# Patient Record
Sex: FEMALE | Race: WHITE | NOT HISPANIC OR LATINO | URBAN - METROPOLITAN AREA
[De-identification: names, ages, dates, MRNs, and addresses within clinical notes are randomized per-mention and may not be internally consistent; named-entity substitution may affect disease eponyms.]

---

## 2020-05-21 ENCOUNTER — EMERGENCY (EMERGENCY)
Facility: HOSPITAL | Age: 25
LOS: 1 days | Discharge: HOME | End: 2020-05-21
Attending: EMERGENCY MEDICINE | Admitting: EMERGENCY MEDICINE
Payer: COMMERCIAL

## 2020-05-21 VITALS
DIASTOLIC BLOOD PRESSURE: 91 MMHG | SYSTOLIC BLOOD PRESSURE: 137 MMHG | TEMPERATURE: 98 F | HEART RATE: 81 BPM | RESPIRATION RATE: 22 BRPM

## 2020-05-21 PROCEDURE — 99291 CRITICAL CARE FIRST HOUR: CPT

## 2020-05-21 PROCEDURE — 99053 MED SERV 10PM-8AM 24 HR FAC: CPT

## 2020-05-21 PROCEDURE — 93010 ELECTROCARDIOGRAM REPORT: CPT | Mod: 59

## 2020-05-21 RX ORDER — SODIUM CHLORIDE 9 MG/ML
1000 INJECTION INTRAMUSCULAR; INTRAVENOUS; SUBCUTANEOUS ONCE
Refills: 0 | Status: COMPLETED | OUTPATIENT
Start: 2020-05-21 | End: 2020-05-21

## 2020-05-21 RX ADMIN — Medication 2 MILLIGRAM(S): at 23:45

## 2020-05-21 NOTE — ED ADULT TRIAGE NOTE - OTHER COMPLAINTS
witnessed seizure like activity at home, described as jerking movements and foaming in the mouth. witnessed by mom, at 6:30 lasted approx "couple minutes". upon arrival to ED, patient A+O x 4 answering questions appropriately. ambulated from triage to stretcher independently.  Hx of depression, anxiety. Started on lithium today by psychiatrist. Denies incontinence, bit tongue, no aura.

## 2020-05-22 ENCOUNTER — APPOINTMENT (OUTPATIENT)
Dept: NEUROLOGY | Facility: CLINIC | Age: 25
End: 2020-05-22
Payer: SELF-PAY

## 2020-05-22 VITALS
HEART RATE: 91 BPM | OXYGEN SATURATION: 98 % | SYSTOLIC BLOOD PRESSURE: 128 MMHG | DIASTOLIC BLOOD PRESSURE: 82 MMHG | TEMPERATURE: 99 F | RESPIRATION RATE: 18 BRPM

## 2020-05-22 DIAGNOSIS — R56.9 UNSPECIFIED CONVULSIONS: ICD-10-CM

## 2020-05-22 DIAGNOSIS — F41.9 ANXIETY DISORDER, UNSPECIFIED: ICD-10-CM

## 2020-05-22 DIAGNOSIS — Z72.0 TOBACCO USE: ICD-10-CM

## 2020-05-22 DIAGNOSIS — F32.9 ANXIETY DISORDER, UNSPECIFIED: ICD-10-CM

## 2020-05-22 LAB
ALBUMIN SERPL ELPH-MCNC: 4.6 G/DL — SIGNIFICANT CHANGE UP (ref 3.3–5)
ALP SERPL-CCNC: 78 U/L — SIGNIFICANT CHANGE UP (ref 40–120)
ALT FLD-CCNC: 35 U/L — SIGNIFICANT CHANGE UP (ref 10–45)
ANION GAP SERPL CALC-SCNC: 19 MMOL/L — HIGH (ref 5–17)
APAP SERPL-MCNC: <5 UG/ML — LOW (ref 10–30)
APPEARANCE UR: ABNORMAL
APTT BLD: 25.6 SEC — LOW (ref 27.5–36.3)
AST SERPL-CCNC: 120 U/L — HIGH (ref 10–40)
BASOPHILS # BLD AUTO: 0.04 K/UL — SIGNIFICANT CHANGE UP (ref 0–0.2)
BASOPHILS NFR BLD AUTO: 0.3 % — SIGNIFICANT CHANGE UP (ref 0–2)
BILIRUB SERPL-MCNC: 0.4 MG/DL — SIGNIFICANT CHANGE UP (ref 0.2–1.2)
BILIRUB UR-MCNC: NEGATIVE — SIGNIFICANT CHANGE UP
BUN SERPL-MCNC: 8 MG/DL — SIGNIFICANT CHANGE UP (ref 7–23)
CALCIUM SERPL-MCNC: 9.6 MG/DL — SIGNIFICANT CHANGE UP (ref 8.4–10.5)
CHLORIDE SERPL-SCNC: 98 MMOL/L — SIGNIFICANT CHANGE UP (ref 96–108)
CO2 SERPL-SCNC: 18 MMOL/L — LOW (ref 22–31)
COLOR SPEC: YELLOW — SIGNIFICANT CHANGE UP
CREAT SERPL-MCNC: 0.85 MG/DL — SIGNIFICANT CHANGE UP (ref 0.5–1.3)
DIFF PNL FLD: ABNORMAL
EOSINOPHIL # BLD AUTO: 0.02 K/UL — SIGNIFICANT CHANGE UP (ref 0–0.5)
EOSINOPHIL NFR BLD AUTO: 0.1 % — SIGNIFICANT CHANGE UP (ref 0–6)
ETHANOL SERPL-MCNC: <10 MG/DL — SIGNIFICANT CHANGE UP (ref 0–10)
GLUCOSE SERPL-MCNC: 167 MG/DL — HIGH (ref 70–99)
GLUCOSE UR QL: NEGATIVE — SIGNIFICANT CHANGE UP
HCG SERPL-ACNC: <0 MIU/ML — SIGNIFICANT CHANGE UP
HCT VFR BLD CALC: 43.1 % — SIGNIFICANT CHANGE UP (ref 34.5–45)
HGB BLD-MCNC: 14.9 G/DL — SIGNIFICANT CHANGE UP (ref 11.5–15.5)
IMM GRANULOCYTES NFR BLD AUTO: 0.8 % — SIGNIFICANT CHANGE UP (ref 0–1.5)
INR BLD: 1 — SIGNIFICANT CHANGE UP (ref 0.88–1.16)
KETONES UR-MCNC: 15 MG/DL
LEUKOCYTE ESTERASE UR-ACNC: NEGATIVE — SIGNIFICANT CHANGE UP
LITHIUM SERPL-MCNC: 0.09 MMOL/L — LOW (ref 0.6–1.2)
LYMPHOCYTES # BLD AUTO: 13.9 % — SIGNIFICANT CHANGE UP (ref 13–44)
LYMPHOCYTES # BLD AUTO: 2.02 K/UL — SIGNIFICANT CHANGE UP (ref 1–3.3)
MAGNESIUM SERPL-MCNC: 2.3 MG/DL — SIGNIFICANT CHANGE UP (ref 1.6–2.6)
MCHC RBC-ENTMCNC: 32.5 PG — SIGNIFICANT CHANGE UP (ref 27–34)
MCHC RBC-ENTMCNC: 34.6 GM/DL — SIGNIFICANT CHANGE UP (ref 32–36)
MCV RBC AUTO: 94.1 FL — SIGNIFICANT CHANGE UP (ref 80–100)
MONOCYTES # BLD AUTO: 0.97 K/UL — HIGH (ref 0–0.9)
MONOCYTES NFR BLD AUTO: 6.7 % — SIGNIFICANT CHANGE UP (ref 2–14)
NEUTROPHILS # BLD AUTO: 11.37 K/UL — HIGH (ref 1.8–7.4)
NEUTROPHILS NFR BLD AUTO: 78.2 % — HIGH (ref 43–77)
NITRITE UR-MCNC: NEGATIVE — SIGNIFICANT CHANGE UP
NRBC # BLD: 0 /100 WBCS — SIGNIFICANT CHANGE UP (ref 0–0)
PCP SPEC-MCNC: SIGNIFICANT CHANGE UP
PH UR: 6 — SIGNIFICANT CHANGE UP (ref 5–8)
PLATELET # BLD AUTO: 358 K/UL — SIGNIFICANT CHANGE UP (ref 150–400)
POTASSIUM SERPL-MCNC: 3.8 MMOL/L — SIGNIFICANT CHANGE UP (ref 3.5–5.3)
POTASSIUM SERPL-SCNC: 3.8 MMOL/L — SIGNIFICANT CHANGE UP (ref 3.5–5.3)
PROT SERPL-MCNC: 7.3 G/DL — SIGNIFICANT CHANGE UP (ref 6–8.3)
PROT UR-MCNC: 30 MG/DL
PROTHROM AB SERPL-ACNC: 11.4 SEC — SIGNIFICANT CHANGE UP (ref 10–12.9)
RBC # BLD: 4.58 M/UL — SIGNIFICANT CHANGE UP (ref 3.8–5.2)
RBC # FLD: 11.9 % — SIGNIFICANT CHANGE UP (ref 10.3–14.5)
SALICYLATES SERPL-MCNC: <0.3 MG/DL — LOW (ref 2.8–20)
SARS-COV-2 RNA SPEC QL NAA+PROBE: SIGNIFICANT CHANGE UP
SODIUM SERPL-SCNC: 135 MMOL/L — SIGNIFICANT CHANGE UP (ref 135–145)
SP GR SPEC: 1.02 — SIGNIFICANT CHANGE UP (ref 1–1.03)
UROBILINOGEN FLD QL: 0.2 E.U./DL — SIGNIFICANT CHANGE UP
WBC # BLD: 14.54 K/UL — HIGH (ref 3.8–10.5)
WBC # FLD AUTO: 14.54 K/UL — HIGH (ref 3.8–10.5)

## 2020-05-22 PROCEDURE — 85610 PROTHROMBIN TIME: CPT

## 2020-05-22 PROCEDURE — 85025 COMPLETE CBC W/AUTO DIFF WBC: CPT

## 2020-05-22 PROCEDURE — 96374 THER/PROPH/DIAG INJ IV PUSH: CPT

## 2020-05-22 PROCEDURE — 87635 SARS-COV-2 COVID-19 AMP PRB: CPT

## 2020-05-22 PROCEDURE — 93005 ELECTROCARDIOGRAM TRACING: CPT

## 2020-05-22 PROCEDURE — 82962 GLUCOSE BLOOD TEST: CPT

## 2020-05-22 PROCEDURE — 81001 URINALYSIS AUTO W/SCOPE: CPT

## 2020-05-22 PROCEDURE — 99205 OFFICE O/P NEW HI 60 MIN: CPT | Mod: 95

## 2020-05-22 PROCEDURE — 36415 COLL VENOUS BLD VENIPUNCTURE: CPT

## 2020-05-22 PROCEDURE — 84702 CHORIONIC GONADOTROPIN TEST: CPT

## 2020-05-22 PROCEDURE — 80053 COMPREHEN METABOLIC PANEL: CPT

## 2020-05-22 PROCEDURE — 70450 CT HEAD/BRAIN W/O DYE: CPT | Mod: 26

## 2020-05-22 PROCEDURE — 83735 ASSAY OF MAGNESIUM: CPT

## 2020-05-22 PROCEDURE — 96372 THER/PROPH/DIAG INJ SC/IM: CPT | Mod: XU

## 2020-05-22 PROCEDURE — 80178 ASSAY OF LITHIUM: CPT

## 2020-05-22 PROCEDURE — 85730 THROMBOPLASTIN TIME PARTIAL: CPT

## 2020-05-22 PROCEDURE — 80307 DRUG TEST PRSMV CHEM ANLYZR: CPT

## 2020-05-22 PROCEDURE — 99284 EMERGENCY DEPT VISIT MOD MDM: CPT | Mod: 25

## 2020-05-22 RX ORDER — DIAZEPAM 10 MG/2ML
10 GEL RECTAL
Qty: 1 | Refills: 0 | Status: ACTIVE | COMMUNITY
Start: 2020-05-22 | End: 1900-01-01

## 2020-05-22 RX ORDER — HALOPERIDOL DECANOATE 100 MG/ML
5 INJECTION INTRAMUSCULAR ONCE
Refills: 0 | Status: COMPLETED | OUTPATIENT
Start: 2020-05-22 | End: 2020-05-22

## 2020-05-22 RX ORDER — CLONAZEPAM 0.5 MG/1
0.5 TABLET ORAL ONCE
Qty: 10 | Refills: 0 | Status: ACTIVE | COMMUNITY
Start: 2020-05-22 | End: 1900-01-01

## 2020-05-22 RX ORDER — DESVENLAFAXINE 100 MG/1
100 TABLET, EXTENDED RELEASE ORAL
Refills: 0 | Status: ACTIVE | COMMUNITY

## 2020-05-22 RX ORDER — LISDEXAMFETAMINE DIMESYLATE 40 MG/1
40 CAPSULE ORAL
Refills: 0 | Status: ACTIVE | COMMUNITY

## 2020-05-22 RX ORDER — ONDANSETRON 8 MG/1
4 TABLET, FILM COATED ORAL ONCE
Refills: 0 | Status: COMPLETED | OUTPATIENT
Start: 2020-05-22 | End: 2020-05-22

## 2020-05-22 RX ORDER — ACETAMINOPHEN 500 MG
650 TABLET ORAL ONCE
Refills: 0 | Status: COMPLETED | OUTPATIENT
Start: 2020-05-22 | End: 2020-05-22

## 2020-05-22 RX ADMIN — Medication 2 MILLIGRAM(S): at 00:15

## 2020-05-22 RX ADMIN — SODIUM CHLORIDE 1000 MILLILITER(S): 9 INJECTION INTRAMUSCULAR; INTRAVENOUS; SUBCUTANEOUS at 00:45

## 2020-05-22 RX ADMIN — HALOPERIDOL DECANOATE 5 MILLIGRAM(S): 100 INJECTION INTRAMUSCULAR at 00:00

## 2020-05-22 RX ADMIN — ONDANSETRON 4 MILLIGRAM(S): 8 TABLET, FILM COATED ORAL at 03:11

## 2020-05-22 RX ADMIN — Medication 650 MILLIGRAM(S): at 06:00

## 2020-05-22 NOTE — ED PROVIDER NOTE - CRITICAL CARE PROVIDED
interpretation of diagnostic studies/additional history taking/consultation with other physicians/telephone consultation with the patient's family/direct patient care (not related to procedure)/documentation

## 2020-05-22 NOTE — ED PROVIDER NOTE - PATIENT PORTAL LINK FT
You can access the FollowMyHealth Patient Portal offered by [facility] by registering at the following website: [email or web address]. By joining [employer]’s FollowMyHealth portal, you will also be able to view your health information using other applications (apps) compatible with our system.

## 2020-05-22 NOTE — ED PROVIDER NOTE - CARE PROVIDER_API CALL
Pamela Strauss  Neurology  [address] [address]  Phone: [phone]  Fax: [phone]  Follow Up Time:

## 2020-05-22 NOTE — ED PROVIDER NOTE - CLINICAL SUMMARY MEDICAL DECISION MAKING FREE TEXT BOX
new onset seizure activity, one at home and second one in ED, no hx of seizures. started lithium today, admits to xanax use at home. prolonged postictal phase  -check labs, ekg  -ivf  -CT head  -utox  -speak with epilepsy service

## 2020-05-22 NOTE — ED PROVIDER NOTE - NSFOLLOWUPINSTRUCTIONS_ED_ALL_ED_FT
Generalized Tonic Clonic Seizures    WHAT YOU NEED TO KNOW:    A generalized tonic-clonic seizure may also be called a grand mal seizure. A seizure means an abnormal area in your brain sometimes sends bursts of electrical activity. A generalized seizure affects both sides of your brain. Tonic and clonic are phases that happen during the seizure. The tonic phase causes your muscles to become stiff. You lose consciousness and may fall down. The clonic phase causes convulsions (repeated muscle contractions). A seizure may last from a few seconds up to 3 minutes. It is an emergency if it lasts longer than 5 minutes.    DISCHARGE INSTRUCTIONS:    Call your local emergency number (911 in the ), or have someone else call, for any of the following:     This is the first seizure you have ever had.      You have trouble breathing or feeling alert after a seizure.      The seizure lasts longer than 5 minutes.      You had a seizure in water, such as in a swimming pool or hot tub.      You have diabetes or are pregnant and had a seizure.    Call your doctor if:     You have a second seizure within 24 hours of the first.       You are injured during a seizure.       You feel you are not able to cope with your condition.      Your seizures start to happen more often.      You are confused longer than usual after a seizure.      You are planning to get pregnant or are currently pregnant.      You have questions or concerns about your condition or care.    Medicines:     Medicines may be given to treat certain health conditions. You may need antiepileptic medicine if your seizures are caused by epilepsy. You may need medicine daily to prevent seizures or during a seizure to stop it. Do not stop taking your medicine unless directed by your healthcare provider.       Take your medicine as directed. Contact your healthcare provider if you think your medicine is not helping or if you have side effects. Tell him of her if you are allergic to any medicine. Keep a list of the medicines, vitamins, and herbs you take. Include the amounts, and when and why you take them. Bring the list or the pill bottles to follow-up visits. Carry your medicine list with you in case of an emergency.    What you can do to prevent a tonic-clonic seizure: You may not be able to prevent every seizure. The following can help you manage triggers that may make a seizure start:     Take antiseizure medicine every day at the same time. This will also help prevent medicine side effects. Set an alarm to help remind you to take your medicine every day. If you are a woman, talk to your provider about family planning while you are taking this medicine.      Manage stress. Stress can be a trigger for epilepsy. Exercise can help you reduce stress. Talk to your healthcare provider about exercise that is safe for you. Illness can be a form of stress. Eat a variety of healthy foods and drink plenty of liquids during an illness. Talk to your healthcare provider about other ways to manage stress.      Set a regular sleep schedule. A lack of sleep can trigger a seizure. Try to go to sleep and wake up at the same time every day. Keep your bedroom quiet and dark. Talk to your healthcare provider if you are having trouble sleeping.      Limit or do not drink alcohol as directed. Alcohol can trigger a seizure, especially if you drink a large amount at one time. A drink of alcohol is 12 ounces of beer, 1½ ounces of liquor, or 5 ounces of wine. Talk to your healthcare provider about a safe amount of alcohol for you. Your provider may recommend that you do not drink any alcohol. Tell him or her if you need help to quit drinking.    What you can do to manage tonic-clonic seizures: The following can help you manage the seizures if you have more than one:     Keep a seizure diary. This can help you find your triggers and avoid them. Possible triggers include illness, lack of sleep, hormone changes, alcohol, drugs, lights, and stress. Write down the dates of your seizures, where you were, and what you were doing. Include how you felt before and after.      Record any auras you have before a seizure. An aura is a sign that you are about to have a seizure. Auras happen before certain types of seizures that are in only 1 part of the brain. The aura may happen seconds before a seizure, or up to an hour before. You may feel, see, hear, or smell something. Examples include part of your body becoming hot. You may see a flash of light or hear something. You may have anxiety or déjà vu. If you have an aura, include it in your seizure diary.      Create a care plan. Tell family, friends, and coworkers about your epilepsy. Give them instructions that tell them how they can keep you safe if you have a seizure.      Ask what safety precautions you should take. Talk with your healthcare provider about driving. You may not be able to drive until you are seizure-free for a period of time. You will need to check the law where you live. Also talk to your healthcare provider about swimming and bathing. You may drown or develop life-threatening heart or lung damage if you have a seizure in water.      Carry medical alert identification. Wear medical alert jewelry or carry a card that says you have tonic-clonic seizures. Ask your healthcare provider where to get these items.       How others can keep you safe during a seizure: Give the following instructions to family, friends, and coworkers:     Do not panic.      Do not hold me down or put anything in my mouth.      Gently guide me to the floor or a soft surface.       Place me on my side to help prevent me from swallowing saliva or vomit.      Protect me from injury. Remove sharp or hard objects from the area surrounding me, or cushion my head.      Loosen the clothing around my head and neck.       Time how long my seizure lasts. Call 911 if my seizure lasts longer than 5 minutes or if I have a second seizure.      Stay with me until my seizure ends. Let me rest until I am fully awake.       Perform CPR if I stop breathing or you cannot feel my pulse.       Do not give me anything to eat or drink until I am fully awake.     Follow up with your doctor or neurologist as directed: If you take antiseizure medicine, you will need blood tests to check the level in your blood. The medicine may need to be changed or adjusted. Write down your questions so you remember to ask them during your visits. Call to schedule a tele-appointment today with Dr. Strauss  [phone], ext [phone], neurology department    Generalized Tonic Clonic Seizures    WHAT YOU NEED TO KNOW:    A generalized tonic-clonic seizure may also be called a grand mal seizure. A seizure means an abnormal area in your brain sometimes sends bursts of electrical activity. A generalized seizure affects both sides of your brain. Tonic and clonic are phases that happen during the seizure. The tonic phase causes your muscles to become stiff. You lose consciousness and may fall down. The clonic phase causes convulsions (repeated muscle contractions). A seizure may last from a few seconds up to 3 minutes. It is an emergency if it lasts longer than 5 minutes.    DISCHARGE INSTRUCTIONS:    Call your local emergency number (911 in the [place]), or have someone else call, for any of the following:     This is the first seizure you have ever had.      You have trouble breathing or feeling alert after a seizure.      The seizure lasts longer than 5 minutes.      You had a seizure in water, such as in a swimming pool or hot tub.      You have diabetes or are pregnant and had a seizure.    Call your doctor if:     You have a second seizure within 24 hours of the first.       You are injured during a seizure.       You feel you are not able to cope with your condition.      Your seizures start to happen more often.      You are confused longer than usual after a seizure.      You are planning to get pregnant or are currently pregnant.      You have questions or concerns about your condition or care.    Medicines:     Medicines may be given to treat certain health conditions. You may need antiepileptic medicine if your seizures are caused by epilepsy. You may need medicine daily to prevent seizures or during a seizure to stop it. Do not stop taking your medicine unless directed by your healthcare provider.       Take your medicine as directed. Contact your healthcare provider if you think your medicine is not helping or if you have side effects. Tell him of her if you are allergic to any medicine. Keep a list of the medicines, vitamins, and herbs you take. Include the amounts, and when and why you take them. Bring the list or the pill bottles to follow-up visits. Carry your medicine list with you in case of an emergency.    What you can do to prevent a tonic-clonic seizure: You may not be able to prevent every seizure. The following can help you manage triggers that may make a seizure start:     Take antiseizure medicine every day at the same time. This will also help prevent medicine side effects. Set an alarm to help remind you to take your medicine every day. If you are a woman, talk to your provider about family planning while you are taking this medicine.      Manage stress. Stress can be a trigger for epilepsy. Exercise can help you reduce stress. Talk to your healthcare provider about exercise that is safe for you. Illness can be a form of stress. Eat a variety of healthy foods and drink plenty of liquids during an illness. Talk to your healthcare provider about other ways to manage stress.      Set a regular sleep schedule. A lack of sleep can trigger a seizure. Try to go to sleep and wake up at the same time every day. Keep your bedroom quiet and dark. Talk to your healthcare provider if you are having trouble sleeping.      Limit or do not drink alcohol as directed. Alcohol can trigger a seizure, especially if you drink a large amount at one time. A drink of alcohol is 12 ounces of beer, 1½ ounces of liquor, or 5 ounces of wine. Talk to your healthcare provider about a safe amount of alcohol for you. Your provider may recommend that you do not drink any alcohol. Tell him or her if you need help to quit drinking.    What you can do to manage tonic-clonic seizures: The following can help you manage the seizures if you have more than one:     Keep a seizure diary. This can help you find your triggers and avoid them. Possible triggers include illness, lack of sleep, hormone changes, alcohol, drugs, lights, and stress. Write down the dates of your seizures, where you were, and what you were doing. Include how you felt before and after.      Record any auras you have before a seizure. An aura is a sign that you are about to have a seizure. Auras happen before certain types of seizures that are in only 1 part of the brain. The aura may happen seconds before a seizure, or up to an hour before. You may feel, see, hear, or smell something. Examples include part of your body becoming hot. You may see a flash of light or hear something. You may have anxiety or déjà vu. If you have an aura, include it in your seizure diary.      Create a care plan. Tell family, friends, and coworkers about your epilepsy. Give them instructions that tell them how they can keep you safe if you have a seizure.      Ask what safety precautions you should take. Talk with your healthcare provider about driving. You may not be able to drive until you are seizure-free for a period of time. You will need to check the law where you live. Also talk to your healthcare provider about swimming and bathing. You may drown or develop life-threatening heart or lung damage if you have a seizure in water.      Carry medical alert identification. Wear medical alert jewelry or carry a card that says you have tonic-clonic seizures. Ask your healthcare provider where to get these items.       How others can keep you safe during a seizure: Give the following instructions to family, friends, and coworkers:     Do not panic.      Do not hold me down or put anything in my mouth.      Gently guide me to the floor or a soft surface.       Place me on my side to help prevent me from swallowing saliva or vomit.      Protect me from injury. Remove sharp or hard objects from the area surrounding me, or cushion my head.      Loosen the clothing around my head and neck.       Time how long my seizure lasts. Call 911 if my seizure lasts longer than 5 minutes or if I have a second seizure.      Stay with me until my seizure ends. Let me rest until I am fully awake.       Perform CPR if I stop breathing or you cannot feel my pulse.       Do not give me anything to eat or drink until I am fully awake.     Follow up with your doctor or neurologist as directed: If you take antiseizure medicine, you will need blood tests to check the level in your blood. The medicine may need to be changed or adjusted. Write down your questions so you remember to ask them during your visits.

## 2020-05-22 NOTE — ED ADULT NURSE NOTE - OBJECTIVE STATEMENT
pt a&ox4 bib mother s/p witnessed seizure approx 2 minutes at home around 630pm, pt jerking and foaming at the mouth as per mother. pt reports starting lithium today. shortly after transferring pt to stretcher, 30 second seizure witnessed by pca. md yee called to bedside. 2mg ativan im given. seizure resolved and pt became agitated. screaming, thrashing in stretcher, hitting, biting staff. haldol 5mg im and additional ativan 2mg im given. agitation resolved approx 40 minutes later. pt has no recollection of events post triage. admits to taking xanax yesterday. denies other drug use. pt returned to baseline a&ox4. bruising and swelling noted to tongue. md yee aware. ice given. bleeding controlled. pt now resting comfortably in stretcher. vss. denies cp, sob, d, fevers, cough. 1 episode emesis. reports mild nausea. zofran ordered.

## 2020-05-22 NOTE — ED PROVIDER NOTE - PROGRESS NOTE DETAILS
no further seizure activity. no focal neuro deficits steady gait, fluent speech, no neuro deficits. spoke with Dr. Strauss - states pt can be discharged and she will do televisit with pt later today.  message left for pt's mom. pt aox3, ambulating well. no SI/HI. states will f/u with Dr. Strauss  I have discussed the discharge plan with the patient. The patient agrees with the plan, as discussed.  The patient understands Emergency Department diagnosis is a preliminary diagnosis often based on limited information and that the patient must adhere to the follow-up plan as discussed.  The patient understands that if the symptoms worsen the patient may return to the Emergency Department at any time for further evaluation and treatment.

## 2020-05-22 NOTE — ED PROVIDER NOTE - OBJECTIVE STATEMENT
24F hx depression/anxiety, c/o possible seizure activity at home.  per mom pt with jerking movements and foaming at mouth around 6:30P.  upon ED arrival pt states she was started on lithium today by psychiatrist. denied drug use.    pt placed in stretcher and shortly after began having tonic-clonic seizure activity.  MD called to bedside, pt with seizure, given ativan 2mg im.  seizure activity resolved. pt then became extremely agitated, trying to climb out of stretcher, yelling and screaming at staff.  given haldol 5mg and then another 2mg ativan im for pt and staff safety.  pt noted to have very large pupils, unresponsive.      40min later pt able to speak and answer questions appropriately. does not recall any events that occurred after being triaged.  states she has been taking a lot of xanax lately, last taken yesterday.  denies alcohol. 24F hx depression/anxiety, c/o possible seizure activity at home.  per mom pt with jerking movements and foaming at mouth around 6:30P.  upon ED arrival pt states she was started on lithium today by psychiatrist. denied drug use.    pt placed in stretcher and shortly after began having tonic-clonic seizure activity.  MD called to bedside, pt with seizure, given ativan 2mg im.  seizure activity resolved. pt then became extremely agitated, trying to climb out of stretcher, yelling and screaming at staff.  given haldol 5mg and then another 2mg ativan im for pt and staff safety.  pt noted to have very large pupils, unresponsive.      40min later pt able to speak and answer questions appropriately. does not recall any events that occurred after being triaged.  states she has been taking xanax lately, last taken yesterday.  denies alcohol.

## 2020-05-22 NOTE — ED ADULT NURSE NOTE - NSIMPLEMENTINTERV_GEN_ALL_ED
Implemented All Fall Risk Interventions:  [place] to call system. Call bell, personal items and telephone within reach. Instruct patient to call for assistance. Room bathroom lighting operational. Non-slip footwear when patient is off stretcher. Physically safe environment: no spills, clutter or unnecessary equipment. Stretcher in lowest position, wheels locked, appropriate side rails in place. Provide visual cue, wrist band, yellow gown, etc. Monitor gait and stability. Monitor for mental status changes and reorient to person, place, and time. Review medications for side effects contributing to fall risk. Reinforce activity limits and safety measures with patient and family.

## 2020-05-25 DIAGNOSIS — Z88.8 ALLERGY STATUS TO OTHER DRUGS, MEDICAMENTS AND BIOLOGICAL SUBSTANCES: ICD-10-CM

## 2020-05-25 DIAGNOSIS — R56.9 UNSPECIFIED CONVULSIONS: ICD-10-CM

## 2020-05-29 NOTE — HISTORY OF PRESENT ILLNESS
[TextBox_4] : 24-year-old woman with anxiety and depression (on Vyvanse and disvenlafaxine). She was recently started on Lithium 150 mg by her psychiatrist.  She experienced new onset of seizures shortly after initiating the Lithium. She was seen via telehealth by Dr. Schroeder and it was recommended to stop the Lithium and she was given prn Klonopin.

## 2020-06-01 ENCOUNTER — APPOINTMENT (OUTPATIENT)
Dept: PULMONOLOGY | Facility: CLINIC | Age: 25
End: 2020-06-01

## 2020-06-09 ENCOUNTER — NON-APPOINTMENT (OUTPATIENT)
Age: 25
End: 2020-06-09

## 2020-07-20 NOTE — HISTORY OF PRESENT ILLNESS
[Home] : at home, [unfilled] , at the time of the visit. [Medical Office: ([facility])___] : at the medical office located in  [Mother] : mother [Verbal consent obtained from patient] : the patient, [unfilled] [FreeTextEntry1] : \maura paris 24-year-old woman with anxiety and depression (on Vyvanse and disvenlafaxine) who presents for initial visit following an ER visit earlier today for new onset seizures.\maura \maura Patient and her mother report that she was recently started on Lithium 150 mg by her psychiatrist.  She took the first dose yesterday afternoon.  Last night she suddenly felt very hot and clammy and started throwing up, then lost consciousness.  According to her mother who was with her at the time, she slid off her chair with her entire body tense and shaking.  The episode lasted ~1-2 minutes.  She bit the left side of her tongue.  Afterward she was very confused and agitated.  \par \par She was brought to [facility] ED where she had a second seizure in the ED and was given 2 mg of Ativan.  ED team contacted Dr. Strauss who recommended telehealth eval today.\maura \maura Patient and her mother report that she has never had a prior seizure.  She did have a head injury with loss of consciousness about a year ago.  No family history of seizures or epilepsy.  She took a Klonopin (borrowed from her mother) about 3 days ago for insomnia but does not chronically use benzodiazepines.  She and her mother both report that she has been drinking heavily during quarantine -- about half a bottle of wine or several hard seltzers per night.  Occasionally smokes marijuana but denies other drug use.

## 2020-07-20 NOTE — DISCUSSION/SUMMARY
[FreeTextEntry1] : New onset seizures, with 2 seizures within 24 hours.\par \par Unclear whether this is new onset epilepsy, or provoked seizures in the setting of polypharmacy (addition of Lithium to Vyvanse and deslavafaxine).\par \par Recommend holding Lithium and will obtain MRI and ambulatory EEG to assess underlying risk of unprovoked seizures.  If work up is unremarkable and seizures do not recur, would not start AED treatment.\par \par In the meantime will send oral Klonopin 0.5 mg and rectal Diastat for breakthrough seizures.  Risks of benzodiazepine abuse, dependence, and withdrawal were discussed and I emphasized that oral Klonopin should be used only for seizure auras/breakthrough seizures.\par \par Risks of excessive alcohol use were discussed.  Recommend no more than 7 drinks/week per CDC guidelines, and no more than 1-2 drinks per day.\par \par [place] state driving restrictions reviewed, no driving for 1 year.\par \par 60 minutes of face-to-face time spent on this encounter of which >50% was spent in counseling with the patient and her mother as described above, and in coordination of care.

## 2025-06-08 ENCOUNTER — EMERGENCY (EMERGENCY)
Facility: HOSPITAL | Age: 30
LOS: 1 days | End: 2025-06-08
Attending: EMERGENCY MEDICINE | Admitting: EMERGENCY MEDICINE
Payer: SELF-PAY

## 2025-06-08 VITALS
RESPIRATION RATE: 18 BRPM | OXYGEN SATURATION: 97 % | HEART RATE: 79 BPM | SYSTOLIC BLOOD PRESSURE: 117 MMHG | TEMPERATURE: 98 F | DIASTOLIC BLOOD PRESSURE: 87 MMHG

## 2025-06-08 PROBLEM — F41.9 ANXIETY DISORDER, UNSPECIFIED: Chronic | Status: ACTIVE | Noted: 2020-05-22

## 2025-06-08 PROBLEM — F32.9 MAJOR DEPRESSIVE DISORDER, SINGLE EPISODE, UNSPECIFIED: Chronic | Status: ACTIVE | Noted: 2020-05-22

## 2025-06-08 PROCEDURE — 99053 MED SERV 10PM-8AM 24 HR FAC: CPT

## 2025-06-08 PROCEDURE — 99283 EMERGENCY DEPT VISIT LOW MDM: CPT

## 2025-06-08 RX ORDER — PREDNISONE 20 MG/1
60 TABLET ORAL ONCE
Refills: 0 | Status: COMPLETED | OUTPATIENT
Start: 2025-06-08 | End: 2025-06-08

## 2025-06-08 RX ORDER — CLONAZEPAM 0.5 MG/1
1 TABLET ORAL
Qty: 5 | Refills: 0
Start: 2025-06-08 | End: 2025-06-10

## 2025-06-08 RX ORDER — ALBUTEROL SULFATE 2.5 MG/3ML
2 VIAL, NEBULIZER (ML) INHALATION
Qty: 1 | Refills: 0
Start: 2025-06-08

## 2025-06-08 RX ORDER — PREDNISONE 20 MG/1
2 TABLET ORAL
Qty: 8 | Refills: 0
Start: 2025-06-08 | End: 2025-06-11

## 2025-06-08 RX ADMIN — PREDNISONE 60 MILLIGRAM(S): 20 TABLET ORAL at 02:52

## 2025-06-08 NOTE — ED ADULT TRIAGE NOTE - CHIEF COMPLAINT QUOTE
BIBA with complaints of chest tightness. Pt has hx asthma and used her rescue inhaler 6x without relief. Arrives with duoneb in progress. Saturating 97%.

## 2025-06-08 NOTE — ED PROVIDER NOTE - NSFOLLOWUPINSTRUCTIONS_ED_ALL_ED_FT
Please take the prescribed steroids for the next 3 days.  Please continue using the albuterol inhaler every 4 hours as needed for wheezing.

## 2025-06-08 NOTE — ED PROVIDER NOTE - PATIENT PORTAL LINK FT
You can access the FollowMyHealth Patient Portal offered by [facility] by registering at the following website: [email or web address]. By joining [employer]’s FollowMyHealth portal, you will also be able to view your health information using other applications (apps) compatible with our system.

## 2025-06-08 NOTE — ED PROVIDER NOTE - OBJECTIVE STATEMENT
Patient with history of asthma since childhood presents to the ER with difficulty breathing. Patient reports recent development of rhinovirus/common cold which progressed to bronchitis and then mild pneumonia over the past few weeks. 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 Patient used up all inhalers brought for the trip, which was extended unexpectedly. Tonight, patient experienced an acute asthma attack and received a nebulizer treatment en route to the ER, which provided some relief. Patient denies any other current symptoms. Recent stressors include grief from brother's death and attending the . Patient reports history of panic attacks, which may be exacerbating breathing difficulties.

## 2025-06-08 NOTE — ED PROVIDER NOTE - CLINICAL SUMMARY MEDICAL DECISION MAKING FREE TEXT BOX
Patient presenting with acute asthma exacerbation, likely triggered by recent respiratory infection and emotional stress. Differential diagnoses include asthma exacerbation, anxiety-induced dyspnea, and pneumonia. Asthma exacerbation is most likely given patient's history and response to nebulizer treatment.    Plan:  1. Administer dose of oral steroids (prednisone) in the ER.  2. Observe patient for short period to ensure no need for additional breathing treatments.  3. Prescribe new rescue inhaler.  4. Provide short course of oral steroids to continue at home.  5. Offer limited supply of anti-anxiety medication (2mg clonazepam) for immediate use, as patient reports running out of personal supply.  6. Recommend follow-up with primary care physician or pulmonologist upon return home for ongoing asthma management.

## 2025-06-08 NOTE — ED PROVIDER NOTE - PHYSICAL EXAMINATION
VITAL SIGNS: I have reviewed nursing notes and confirm.  CONSTITUTIONAL: Well-developed; well-nourished; in no acute distress.  HEAD: Normocephalic; atraumatic.  EYES: EOM intact; conjunctiva and sclera clear.  ENT: nose appears normal  NECK: Supple  CARD: S1, S2 normal; no murmurs, gallops, or rubs. Regular rate and rhythm.  RESP: No wheezes, rales or rhonchi.  EXT: Normal ROM. No clubbing, cyanosis or edema.  PSYCH: Cooperative, appropriate, appears anxious with pressured speech

## 2025-06-10 DIAGNOSIS — J45.901 UNSPECIFIED ASTHMA WITH (ACUTE) EXACERBATION: ICD-10-CM

## 2025-06-10 DIAGNOSIS — Z88.6 ALLERGY STATUS TO ANALGESIC AGENT: ICD-10-CM
